# Patient Record
Sex: MALE | Race: WHITE | NOT HISPANIC OR LATINO | Employment: FULL TIME | URBAN - METROPOLITAN AREA
[De-identification: names, ages, dates, MRNs, and addresses within clinical notes are randomized per-mention and may not be internally consistent; named-entity substitution may affect disease eponyms.]

---

## 2023-12-01 ENCOUNTER — OFFICE VISIT (OUTPATIENT)
Dept: NEUROLOGY | Facility: CLINIC | Age: 58
End: 2023-12-01
Payer: COMMERCIAL

## 2023-12-01 ENCOUNTER — LAB (OUTPATIENT)
Dept: LAB | Facility: LAB | Age: 58
End: 2023-12-01
Payer: COMMERCIAL

## 2023-12-01 VITALS
HEIGHT: 69 IN | BODY MASS INDEX: 27.85 KG/M2 | HEART RATE: 63 BPM | DIASTOLIC BLOOD PRESSURE: 80 MMHG | TEMPERATURE: 97 F | WEIGHT: 188 LBS | SYSTOLIC BLOOD PRESSURE: 153 MMHG | RESPIRATION RATE: 18 BRPM

## 2023-12-01 DIAGNOSIS — R41.3 MEMORY LOSS: Primary | ICD-10-CM

## 2023-12-01 DIAGNOSIS — R41.3 MEMORY LOSS: ICD-10-CM

## 2023-12-01 LAB
TSH SERPL-ACNC: 2.54 MIU/L (ref 0.44–3.98)
VIT B12 SERPL-MCNC: 461 PG/ML (ref 211–911)

## 2023-12-01 PROCEDURE — 36415 COLL VENOUS BLD VENIPUNCTURE: CPT

## 2023-12-01 PROCEDURE — 84443 ASSAY THYROID STIM HORMONE: CPT

## 2023-12-01 PROCEDURE — 99205 OFFICE O/P NEW HI 60 MIN: CPT | Performed by: PSYCHIATRY & NEUROLOGY

## 2023-12-01 PROCEDURE — 1036F TOBACCO NON-USER: CPT | Performed by: PSYCHIATRY & NEUROLOGY

## 2023-12-01 PROCEDURE — 82607 VITAMIN B-12: CPT

## 2023-12-01 RX ORDER — FLUTICASONE PROPIONATE 50 MCG
1 SPRAY, SUSPENSION (ML) NASAL DAILY
COMMUNITY

## 2023-12-01 RX ORDER — CELECOXIB 200 MG/1
200 CAPSULE ORAL
COMMUNITY
Start: 2022-12-05

## 2023-12-01 RX ORDER — BUPROPION HYDROCHLORIDE 75 MG/1
150 TABLET ORAL 3 TIMES DAILY
COMMUNITY
Start: 2022-11-07

## 2023-12-01 RX ORDER — OMEPRAZOLE 20 MG/1
20 CAPSULE, DELAYED RELEASE ORAL
COMMUNITY
Start: 2023-01-19

## 2023-12-01 RX ORDER — TAMSULOSIN HYDROCHLORIDE 0.4 MG/1
0.4 CAPSULE ORAL DAILY
COMMUNITY

## 2023-12-01 RX ORDER — CLONAZEPAM 1 MG/1
1 TABLET ORAL EVERY 8 HOURS PRN
COMMUNITY
Start: 2023-01-31

## 2023-12-01 RX ORDER — LORATADINE 10 MG/1
10 TABLET ORAL DAILY
COMMUNITY

## 2023-12-01 RX ORDER — FLUOXETINE HYDROCHLORIDE 20 MG/1
80 CAPSULE ORAL DAILY
COMMUNITY
Start: 2023-01-19

## 2023-12-01 NOTE — PATIENT INSTRUCTIONS
It was a pleasure seeing you today.     I want you to start Thiamine 100mg daily.    I do recommend following either with a memory expert - Some of the  memory experts are Dr. Eugene Hope, Dr. Fernandez and Dr. Nathan Cerna. There are some research options as well at the [facility] or with general neurology.    Structural and organizational reinforcements are often helpful for individuals with memory complaints and executive difficulties.  from the following strategies:   a. Following a routine and consistent daily schedule.    b. Continuing to use a single planner, calendar, or electronic organizer to plan and manage appointment dates, activities, and tasks.   c. Working on one task at a time until it is completed.   d. Keeping a single list of tasks, listed by priority, and marking them when complete.   e. Placing reminders in places where they are easily noticed (e.g., a note on door).   f. Using a mobile phone or electronic calendar to set timers for tasks and events (e.g., medications, appointments).   g. Writing down important information (e.g., tasks, conversational details, list items) immediately, to strengthen encoding and for later reference. This will also help to free up cognitive resources to focus on the task at hand.   h. Associating new information with older, previously stored information (e.g., familiar categories, anecdotes, references, reminders).   i. Using recognition cue cards in everyday memory situations that come up with frequency, such as lists of routine household tasks, errands, or grocery items to prompt recall.   j. Having specific locations for frequently used items (e.g., keys, wallet, phone), especially for items most often needed when exiting home.       stress may intermittently interfere with cognitive effectiveness,      encouraged to maintain a socially active lifestyle, as social activities may offer cognitive stimulation, help to prevent feelings of  "depression, and provide emotional benefits that promote quality of life. You are likely to benefit from maximizing engagement in activities that you enjoy, as well as pursuing new meaningful hobbies.      Leading a physically, socially, and cognitively active lifestyle is important for healthy brain aging. Within the bounds of safety, good judgment, and medical advice, this includes engaging in regular physical activity (e.g., walking, swimming, yoga).   In general, the following activities and interventions are recommended for optimizing brain health and preserving cognitive function:   a. \"heart-healthy\"/cardiovascular diet;   b. good stress management (e.g., relaxation techniques);   c. management of any vascular risks (e.g., hypertension, cholesterol);   d. 30-60 minutes of daily aerobic exercise (e.g., walking, swimming);   e. social engagement (e.g., getting together with other people);    f. adequate sleep; and    g. cognitively stimulating activities (e.g., classes to learn new things, challenging puzzles).          For any urgent issues or needing to speak to a medical assistant please call [phone], option 6 during our office hours Monday-Friday 8am-4pm, and leave a voicemail with your concern.  My office will try to reach back you as soon as possible within 24 (business) hours.  If you have an emergency please call 911 or visit a local urgent care or nearest emergency room.      Please understand that Secure-24 is a useful communication tool for simple \"normal\" results or a refill request but I would not recommend using this tool for emergent or urgent issues or for conversations with me.  I am happy to ask my staff to rearrange a follow up visit or a virtual visit sooner than requested if appropriate for your care.     "

## 2023-12-01 NOTE — PROGRESS NOTES
Consultation:  Link     Mihai Ruelas is a 58 y.o. year old male here for consult for memory issues.   Referred by Dr. Gutierrez.   Accompanied by wife today as well who also provides history.   HPI  Memory issues began 4 years ago.  He has mood / behavioral issues.  Hx of drug and alcohol abuse.  Hx of use of various drugs in his life ie Marijuana, cocaine crystal meth ( last use - 18 months ago)-   Now does therapy .  He confabulates a lot.   He has trouble getting a word out.   He sees a psychiatrist now, Dr. Lloyd.  Mother and sister and brother and son have alcoholism. Mother had Alzheimer's disease.  Denies SI/HI.  Depression and anxiety are not controlled.   He is easily frustrated. Forgets things people tell him. Cannot complete a task.  Fatigue a lot.  He feels stubborn.  He feels the need for instant gratification.   He sleeps a lot, more than usual. Takes naps.  He wakes up to use the bathroom a lot at night.  Nov 8th- he stopped working.  He was having trouble staying at work.   He was sober for 14 years on / off.    Review of Systems    There is no problem list on file for this patient.    History reviewed. No pertinent past medical history.  Past Surgical History:   Procedure Laterality Date    CT ANGIO CORONARY ART WITH HEARTFLOW IF SCORE >30%  1/28/2023    CT ANGIO CORONARY ART WITH HEARTFLOW IF SCORE >30% 1/28/2023     Social History     Tobacco Use    Smoking status: Not on file    Smokeless tobacco: Not on file   Substance Use Topics    Alcohol use: Not on file     family history is not on file.  No current outpatient medications on file.  Not on File  There were no vitals taken for this visit.  Neurological Exam/Physical Exam:    Constitutional: General appearance: no acute distress. Pleasant.   Auscultation of Heart: Regular rate and rhythm, no murmurs, normal S1 and S2.   Carotid Arteries: no bruits  Peripheral Vascular Exam: No swelling, edema or tenderness to palpation in  extremities  Poor attention and concentration.  Language normal.  Recall poor.   Fund of knowledge: Patient displays adequate knowledge of current events.  Eyes: The ophthalmoscopic examination was normal.   Cranial nerve II: Visual fields full to confrontation.   Cranial nerves III, IV, and VI: Pupils round, equally reactive to light; EOMs intact. No nystagmus.   Cranial Nerve V: Facial sensation intact to LT bilaterally.   Cranial nerve VII: no facial droop  Cranial nerve VIII: Hearing is intact  Cranial nerves IX and X: Palate elevates symmetrically.   Cranial nerve XI: Shoulder shrug intact.   Cranial nerve XII: Tongue is midline.  Motor:  Muscle bulk was normal in both upper and lower extremities.    No atrophy.   Strength is normal.   Deep Tendon Reflexes: left biceps 2+ , right biceps 2+, left triceps 2+, right triceps 2+, left brachioradialis 2+, right brachioradialis 2+, left patella 2+, right patella 2+, left ankle jerk 2+, right ankle jerk 2+   Plantar Reflex: Toes downgoing to plantar stimulation on the left. Toes downgoing to plantar stimulation on the right.   Sensory Exam: Normal to vibratory sensation  Coordination:  no limb dystaxia and rapid alternating movements are intact.   Gait:  cautious.         Labs:  CBC:   Lab Results   Component Value Date    WBC 6.1 11/18/2018    HGB 16.8 11/18/2018    HCT 48.6 11/18/2018     11/18/2018     BMP:   Lab Results   Component Value Date     11/18/2018    K 4.3 11/18/2018     11/18/2018    CO2 26 11/18/2018    BUN 15 11/18/2018    CREATININE 1.60 (H) 11/18/2018    CALCIUM 9.5 11/18/2018     LFT:   Lab Results   Component Value Date    ALKPHOS 57 11/18/2018    BILITOT 0.5 11/18/2018    PROT 6.8 11/18/2018    ALBUMIN 4.4 11/18/2018    ALT 18 11/18/2018    AST 23 11/18/2018       Assessment/Plan   Problem List Items Addressed This Visit    None  Visit Diagnoses         Codes    Memory loss    -  Primary R41.3         Obtain b12/ tsh/ MRI  brain  Follow with general neurology / memory experts.   Follow up with psychiatry. Sleep and mood and drug use are affecting his memory.   Unlikely dementia.  May have ADHD - follow up with psychiatry , obtain neuropsychology.  Start thiamine.

## 2023-12-12 ENCOUNTER — APPOINTMENT (OUTPATIENT)
Dept: RADIOLOGY | Facility: CLINIC | Age: 58
End: 2023-12-12
Payer: COMMERCIAL

## 2023-12-27 DIAGNOSIS — G47.30 HYPERSOMNIA WITH SLEEP APNEA: Primary | ICD-10-CM

## 2023-12-27 DIAGNOSIS — G47.10 HYPERSOMNIA WITH SLEEP APNEA: Primary | ICD-10-CM

## 2024-01-02 ENCOUNTER — TELEPHONE (OUTPATIENT)
Dept: NEUROLOGY | Facility: CLINIC | Age: 59
End: 2024-01-02
Payer: COMMERCIAL

## 2024-01-16 ENCOUNTER — TELEPHONE (OUTPATIENT)
Dept: NEUROLOGY | Facility: CLINIC | Age: 59
End: 2024-01-16

## 2024-01-17 DIAGNOSIS — R06.83 SNORING: Primary | ICD-10-CM

## 2024-01-18 ENCOUNTER — APPOINTMENT (OUTPATIENT)
Dept: SLEEP MEDICINE | Facility: CLINIC | Age: 59
End: 2024-01-18
Payer: COMMERCIAL

## 2024-03-18 ENCOUNTER — OFFICE VISIT (OUTPATIENT)
Dept: SLEEP MEDICINE | Facility: CLINIC | Age: 59
End: 2024-03-18
Payer: COMMERCIAL

## 2024-03-18 VITALS
HEIGHT: 69 IN | SYSTOLIC BLOOD PRESSURE: 167 MMHG | RESPIRATION RATE: 18 BRPM | BODY MASS INDEX: 27.74 KG/M2 | DIASTOLIC BLOOD PRESSURE: 81 MMHG | OXYGEN SATURATION: 95 % | WEIGHT: 187.3 LBS | HEART RATE: 65 BPM

## 2024-03-18 DIAGNOSIS — G47.30 SLEEP DISORDER BREATHING: Primary | ICD-10-CM

## 2024-03-18 DIAGNOSIS — G47.10 HYPERSOMNIA WITH SLEEP APNEA: ICD-10-CM

## 2024-03-18 DIAGNOSIS — G47.50 PARASOMNIA, UNSPECIFIED TYPE: ICD-10-CM

## 2024-03-18 DIAGNOSIS — G47.30 HYPERSOMNIA WITH SLEEP APNEA: ICD-10-CM

## 2024-03-18 DIAGNOSIS — R06.83 SNORING: ICD-10-CM

## 2024-03-18 PROCEDURE — 99204 OFFICE O/P NEW MOD 45 MIN: CPT | Performed by: GENERAL PRACTICE

## 2024-03-18 PROCEDURE — 1036F TOBACCO NON-USER: CPT | Performed by: GENERAL PRACTICE

## 2024-03-18 ASSESSMENT — COLUMBIA-SUICIDE SEVERITY RATING SCALE - C-SSRS
1. IN THE PAST MONTH, HAVE YOU WISHED YOU WERE DEAD OR WISHED YOU COULD GO TO SLEEP AND NOT WAKE UP?: NO
2. HAVE YOU ACTUALLY HAD ANY THOUGHTS OF KILLING YOURSELF?: NO
6. HAVE YOU EVER DONE ANYTHING, STARTED TO DO ANYTHING, OR PREPARED TO DO ANYTHING TO END YOUR LIFE?: NO

## 2024-03-18 ASSESSMENT — PATIENT HEALTH QUESTIONNAIRE - PHQ9
10. IF YOU CHECKED OFF ANY PROBLEMS, HOW DIFFICULT HAVE THESE PROBLEMS MADE IT FOR YOU TO DO YOUR WORK, TAKE CARE OF THINGS AT HOME, OR GET ALONG WITH OTHER PEOPLE: EXTREMELY DIFFICULT
1. LITTLE INTEREST OR PLEASURE IN DOING THINGS: MORE THAN HALF THE DAYS
3. TROUBLE FALLING OR STAYING ASLEEP OR SLEEPING TOO MUCH: MORE THAN HALF THE DAYS
9. THOUGHTS THAT YOU WOULD BE BETTER OFF DEAD, OR OF HURTING YOURSELF: NOT AT ALL
7. TROUBLE CONCENTRATING ON THINGS, SUCH AS READING THE NEWSPAPER OR WATCHING TELEVISION: NEARLY EVERY DAY
8. MOVING OR SPEAKING SO SLOWLY THAT OTHER PEOPLE COULD HAVE NOTICED. OR THE OPPOSITE, BEING SO FIGETY OR RESTLESS THAT YOU HAVE BEEN MOVING AROUND A LOT MORE THAN USUAL: SEVERAL DAYS
4. FEELING TIRED OR HAVING LITTLE ENERGY: NEARLY EVERY DAY
5. POOR APPETITE OR OVEREATING: MORE THAN HALF THE DAYS
6. FEELING BAD ABOUT YOURSELF - OR THAT YOU ARE A FAILURE OR HAVE LET YOURSELF OR YOUR FAMILY DOWN: MORE THAN HALF THE DAYS
SUM OF ALL RESPONSES TO PHQ QUESTIONS 1-9: 16
2. FEELING DOWN, DEPRESSED OR HOPELESS: SEVERAL DAYS
SUM OF ALL RESPONSES TO PHQ9 QUESTIONS 1 AND 2: 3

## 2024-03-18 ASSESSMENT — ENCOUNTER SYMPTOMS
LOSS OF SENSATION IN FEET: 0
OCCASIONAL FEELINGS OF UNSTEADINESS: 0
DEPRESSION: 1

## 2024-03-18 NOTE — PROGRESS NOTES
I reviewed the resident/fellow's documentation and discussed the patient with the resident/fellow. I agree with the resident/fellow's medical decision making as documented in the note.    Louis Chew, DO

## 2024-03-18 NOTE — PROGRESS NOTES
"     Patient: Mihai Ruelas    [id number]  : 1965 -- AGE 58 y.o.    Provider: Louis Chew DO     Location [facility]   Service Date: 3/18/2024              [facility]  New Visit Note    HISTORY OF PRESENT ILLNESS     The patient's referring provider is: Delmi Shepherd MD    HISTORY OF PRESENT ILLNESS   Mihai Ruelas \"Maninder\" is a 58 y.o. male who presents to a [facility] for a sleep medicine evaluation with concerns of Referral (45 mins to fall sleep wakes up 3/4 times to use the bathroom./ Wakes up Nose is clogged feels like he can't breath. /Sleeps like 8/9 hours feels tired still when wakes up and takes hour naps during the day./No sleep study. ).     The patient  has no past medical history on file..    PAST SLEEP HISTORY    Patient has the following sleep-related diagnoses: Prior sleep study results:     CURRENT HISTORY    On today's visit, 3/18/2024, the patient reports fatigue, shallow sleep, frequent awakenings. He has not tried any intervention for these symptoms.     Sleep schedule  on weekdays / work days:  Usual Bedtime: 9-9:30pm in bed; falls asleep 45 minutes later  Sleep latency: wakes up 11pm to go to the bathroom (falls asleep right away), wakes up every 3 hours after that to urinate 2/2 prostate CA  Wake time : 6:30am  Total sleep time average/day: 9 hours/day  Awakenings: 4 times per night, nocturia, short.   Naps: 1-2 per day, around 11am and sometimes 3pm, about 1 hr each, refreshing.     Sleep Meds: klonopin for anxiety, does not help him sleep  Caffeine: 1cup of coffee in morning and 1 cold brew in afternoon around 2-3pm      Sleep schedule  on weekends/non work days :  Usual Bedtime:  10pm and falls asleep 45mins later   Wake time : 7-7:30am    Sleep meds: klonopin for anxiety, does not help him sleep    Occupation: not currently working but he is a , not working currently but " worked 7am-4:30pm    Preferred sleeping position: SLEEP POSITION: sidelying    Sleep-related ROS:    Snoring:  y  Witnessed apneas: while awake but not sleeping  Gasping/choking: n       Breathing problems: n               Mouth breathing: y       Am Dry mouth:  y           Nasal congestion:  y       am headaches: y    Sleep is described as unrefreshing.     Daytime sleepiness: y  Fatigue or decreased energy: y  Difficulty remembering things in daytime: y  Difficulty staying focused in daytime: : y  Irritable during the day: y  Drowsy driving: no but can't fully focus on road  Hx of car accident: n      RLS screen:  denies    Sleep-related behaviors:   Kicking 1-2x per month, caressing her face 3x per week. Unsure if associated with a dream. Denies any major injuries. Father hx of parkinson's disease.     Sleep environment:  Bed partner :  yes  Pets in bed :   no  Bedroom temperature: BEDROOM TEMP: cool  Noise :   waterfall white noise machine  Issues with bed comfort : n    ESS: 9        REVIEW OF SYSTEMS     REVIEW OF SYSTEMS  Review of Systems   All other systems reviewed and are negative.        ALLERGIES AND MEDICATIONS     ALLERGIES  Allergies   Allergen Reactions    Lisinopril Swelling    Penicillins Rash and Unknown       MEDICATIONS  Current Outpatient Medications   Medication Sig Dispense Refill    buPROPion (Wellbutrin) 75 mg tablet Take 2 tablets (150 mg) by mouth 3 times a day.      celecoxib (CeleBREX) 200 mg capsule Take 1 capsule (200 mg) by mouth once daily.      clonazePAM (KlonoPIN) 1 mg tablet Take 1 tablet (1 mg) by mouth every 8 hours if needed.      ergocalciferol, vitamin D2, (VITAMIN D2 ORAL) Take by mouth.      FLUoxetine (PROzac) 20 mg capsule Take 4 capsules (80 mg) by mouth once daily.      fluticasone (Flonase) 50 mcg/actuation nasal spray Administer 1 spray into each nostril once daily. Shake gently. Before first use, prime pump. After use, clean tip and replace cap.      loratadine  "(Claritin) 10 mg tablet Take 1 tablet (10 mg) by mouth once daily.      omeprazole (PriLOSEC) 20 mg DR capsule Take 1 capsule (20 mg) by mouth once daily.      tamsulosin (Flomax) 0.4 mg 24 hr capsule Take 1 capsule (0.4 mg) by mouth once daily.       No current facility-administered medications for this visit.         PAST HISTORY     PAST MEDICAL HISTORY    Prostate Ca, anxiety, depression, HTN    PAST SURGICAL HISTORY:  Past Surgical History:   Procedure Laterality Date    CT ANGIO CORONARY ART WITH HEARTFLOW IF SCORE >30%  1/28/2023    CT ANGIO CORONARY ART WITH HEARTFLOW IF SCORE >30% 1/28/2023   -R shoulder replacement    FAMILY HISTORY  Family History   Problem Relation Name Age of Onset    Parkinsonism Father       DOES/DOES NOT EC: does not have a family history of sleep disorder.      SOCIAL HISTORY  He  reports that he has never smoked. He has never used smokeless tobacco. He reports that he does not currently use alcohol. He reports that he does not currently use drugs. He currently lives with a partner.     Caffeine consumption: 2 cups of coffee, in am.   Alcohol consumption: n  (quit 6 mos ago)  Smoking: n  Marijuana: not currently, previous when was 33.  Other drugs: was taking pain medication for hip and went through addiction but is not currently      PHYSICAL EXAM     VITAL SIGNS: /81   Pulse 65   Resp 18   Ht 1.753 m (5' 9\") Comment: neck circumfrance 17  Wt 85 kg (187 lb 4.8 oz)   SpO2 95%   BMI 27.66 kg/m²      PREVIOUS WEIGHTS:  Wt Readings from Last 3 Encounters:   03/18/24 85 kg (187 lb 4.8 oz)   12/01/23 85.3 kg (188 lb)       Physical Exam    Constitutional: Alert and oriented, cooperative, no obvious distress.   HENT: normocephalic.   Eyes: PERRLA, nonicteric   Neck: Supple, trachea midline   respiratory: CTA bilaterally, no wheezing/ crackles/ cough  Cardiac: no rub/ gallops  GI:BS in all 4 quadrants, Soft, nontender, no masses  musculoskeletal/ Extremities: No " clubbing  integumentary: no significant rashes observed.   Neurologic: AOx3.   psychiatric: appropriate mood and affect.  Modified Mallampati: 4    RESULTS/DATA         ASSESSMENT/PLAN     Mr. Ruelas is a 58 y.o. male and  has no past medical history on file. He was referred to the [facility] for evaluation of sleep apnea.     Problem List Items Addressed This Visit    None  Visit Diagnoses       Hypersomnia with sleep apnea        Relevant Orders    In-Center Sleep Study (Sleep Provider Only)    Snoring        Relevant Orders    In-Center Sleep Study (Sleep Provider Only)            Problem List and Orders    Pt has pmhx including Prostate Ca, anxiety, depression, HTN.     1- Sleep disorder breathing.   Symptoms include snoring, fatigue, low energy, snoring, night time awakenings, nocturia.     -ordered sleep study    -do not drive or operate heavy machinery if drowsy.  -avoid sedating substances/ medication, alcohol, illicit drugs and tobacco.    2- parasomnia  Kicking, punching, caressing his bed partner. Unsure if these actions are related to dreams.   Unclear if RBD?,  Father with hx of parkinson's    -counseled in details about safety measures.     3- Overweight  counseled on eating a healthy diet and exercising as tolerated.    Follow up after sleep study or sooner as needed.

## 2024-03-18 NOTE — PATIENT INSTRUCTIONS
[facility] Sleep Medicine   [facility]  [address] [place] [facility] [address]  [phone]       NAME: Mihai Ruelas   DATE: 3/18/2024     Your Sleep Provider Today: Louis Chew DO  Your Primary Care Physician: Jules Gutierrez MD   Your Referring Provider: Delmi Shepherd MD    DIAGNOSIS:   1. Sleep disorder breathing  In-Center Sleep Study (Sleep Provider Only)      2. Hypersomnia with sleep apnea  Referral to Adult Sleep Medicine    CANCELED: In-Center Sleep Study (Sleep Provider Only)      3. Snoring  Referral to Adult Sleep Medicine    CANCELED: In-Center Sleep Study (Sleep Provider Only)      4. Parasomnia, unspecified type  In-Center Sleep Study (Sleep Provider Only)          Thank you for coming to the Sleep Medicine Clinic today! Your sleep medicine provider today was: Louis Chew DO Below is a summary of your treatment plan, other important information, and our contact numbers:      TREATMENT PLAN   Follow up after sleep study or sooner as needed.       IMPORTANT INFORMATION     Call 911 for medical emergencies.  Our offices are generally open from Monday-Friday, 9 am - 5 pm.  If you need to get in touch with me, you may either call me and my team(number is below) or you can use [employer].  If a referral for a test, for CPAP, or for another specialist was made, and you have not heard about scheduling this within a week, please call scheduling at [phone] ([phone]).  If you are unable to make your appointment for clinic or an overnight study, kindly call the office at least 48 hours in advance to cancel and reschedule.  If you are on CPAP, please bring your device's card or the device to each clinic appointment.   There are no supporting services by either the sleep doctors or their staff on weekends and Holidays, or after 5 PM on weekdays.   If you have been asked to come to a sleep study, make sure you bring toiletries, a comfy pillow, and any  nighttime medications that you may regularly take. Also be sure to eat dinner before you arrive. We generally do not provide meals.      PRESCRIPTIONS     We require 7 days advanced notice for prescription refills. If we do not receive the request in this time, we cannot guarantee that your medication will be refilled in time.      IMPORTANT PHONE NUMBERS     Sleep Medicine Clinic Fax: [phone]  Appointments (for Pediatric Sleep Clinic): [phone] - option 1  Appointments (for Adult Sleep Clinic): [phone] - option 2  Appointments (For Sleep Studies): [phone] - option 3  Behavioral Sleep Medicine: [phone]  Sleep Surgery: [phone]  ENT (Otolaryngology): [phone]  Headache Clinic (Neurology): [phone]  Neurology: [phone]  Psychiatry: [phone]  Pulmonary Function Testing (PFT) Center: [phone]  Pulmonary Medicine: [phone]  [employer] (DME): [phone]  [employer] (DME): [phone]  [facility] (DME): [phone]      OUR ADULT SLEEP MEDICINE TEAM   Please do not hesitate to call the office or sleep nurse with any questions between appointments:    Adult Sleep Nurses (Quita Dinero, RN and Paulina Reagan RN):  For clinical questions and refilling prescriptions: [phone]  Email sleep diaries and other documents at: [email or web address]    Adult Sleep Medicine Secretaries:  Brittany Russ (For [place]ise[place]/Pagan/Krise/[place]ro[place]/Yeh/Ohara):   P: [phone]  F: [phone]  Marianela Silva (For [place]genbiller): P: [phone]  Fax: [phone]  Lilliam Longo (For Jurcevic/Blank): P: [phone]  F: [phone]  Justine Plascencia (For Julien): P: [phone]  F: [phone]  Rosa Jacobson (For Evelyn/Leon/Naina): P: [phone]  F: [phone]  Jael Davison (For [place]/Zeus): P: [phone]  F: [phone]     Adult Sleep Medicine Advanced Practice Providers:  Joao Corrales  "([place], [place])  Lilia Quintero ([place], [facility])  Zayra Rojas CNP ([place], [place], [place])  Jocelyn Aguilar CNP ([place], [place], [place])  Estelle Victoria ([place], [place], [place])  Araceli Schulz CNP ([place], [place])        OUR SLEEP TESTING LOCATIONS     Our team will contact you to schedule your sleep study, however, you can contact us as follow:  Main Phone Line (scheduling only): [phone]), option 3  Adult and Pediatric Locations   [place] (6 years and older): [facility] - 4th floor ([address]) After hours line: [phone]  [facility] (Main campus: All ages): [facility], 6th floor. After hours line: [phone]   [place] (5 years and older; younger considered on case-by-case basis): [address], [address]. Scheduling  After hours line: [phone]   [place] (6 years and older): [address]; [address]; [address]   [place] (6 years and older): [address]  After hours line: [phone]   [facility] (13 years and older) in [place]: [address], 2nd floor  After hours line: [phone]  [facility] (13 year and older): [address]  After hours line: [phone]     Adult Only Locations:   [place] (18 years and older): [address] [facility], 2nd floor   [place] (18 years and older): [address] [facility] [facility]; 4th floor  After hours line: [phone]  [facility] (18 years and older) at [place]: [address] [facility]  After hours line: [phone]          CONTACTING YOUR SLEEP MEDICINE PROVIDER     Send a message directly to your provider through \"My Chart\", which is the email service through your  Records Account: [email or web address]   Call [phone] and leave a message. One of the administrative assistants will forward the message to your sleep medicine provider " "through \"My Chart\" and/or email.     Your sleep medicine provider for this visit was: Louis Chew DO        "

## 2024-04-04 ENCOUNTER — OFFICE VISIT (OUTPATIENT)
Dept: PSYCHOLOGY | Facility: HOSPITAL | Age: 59
End: 2024-04-04
Payer: COMMERCIAL

## 2024-04-04 DIAGNOSIS — F41.9 ANXIETY: ICD-10-CM

## 2024-04-04 DIAGNOSIS — G47.30 HYPERSOMNIA WITH SLEEP APNEA: ICD-10-CM

## 2024-04-04 DIAGNOSIS — F19.21: ICD-10-CM

## 2024-04-04 DIAGNOSIS — F32.A DEPRESSION, UNSPECIFIED DEPRESSION TYPE: ICD-10-CM

## 2024-04-04 DIAGNOSIS — G47.10 HYPERSOMNIA WITH SLEEP APNEA: ICD-10-CM

## 2024-04-04 DIAGNOSIS — R41.3 MEMORY LOSS: Primary | ICD-10-CM

## 2024-04-04 DIAGNOSIS — R41.9 COGNITIVE COMPLAINTS: ICD-10-CM

## 2024-04-04 PROCEDURE — 96133 NRPSYC TST EVAL PHYS/QHP EA: CPT | Mod: AH | Performed by: CLINICAL NEUROPSYCHOLOGIST

## 2024-04-04 PROCEDURE — 96116 NUBHVL XM PHYS/QHP 1ST HR: CPT | Mod: AH | Performed by: CLINICAL NEUROPSYCHOLOGIST

## 2024-04-04 PROCEDURE — 96116 NUBHVL XM PHYS/QHP 1ST HR: CPT | Performed by: CLINICAL NEUROPSYCHOLOGIST

## 2024-04-04 PROCEDURE — 96138 PSYCL/NRPSYC TECH 1ST: CPT | Performed by: CLINICAL NEUROPSYCHOLOGIST

## 2024-04-04 PROCEDURE — 96139 PSYCL/NRPSYC TST TECH EA: CPT | Mod: AH | Performed by: CLINICAL NEUROPSYCHOLOGIST

## 2024-04-04 PROCEDURE — 96138 PSYCL/NRPSYC TECH 1ST: CPT | Mod: AH | Performed by: CLINICAL NEUROPSYCHOLOGIST

## 2024-04-04 PROCEDURE — 96132 NRPSYC TST EVAL PHYS/QHP 1ST: CPT | Mod: AH | Performed by: CLINICAL NEUROPSYCHOLOGIST

## 2024-04-04 PROCEDURE — 96139 PSYCL/NRPSYC TST TECH EA: CPT | Performed by: CLINICAL NEUROPSYCHOLOGIST

## 2024-04-04 PROCEDURE — 96132 NRPSYC TST EVAL PHYS/QHP 1ST: CPT | Performed by: CLINICAL NEUROPSYCHOLOGIST

## 2024-04-04 PROCEDURE — 96133 NRPSYC TST EVAL PHYS/QHP EA: CPT | Performed by: CLINICAL NEUROPSYCHOLOGIST

## 2024-04-04 NOTE — PROGRESS NOTES
Neuropsychology Evaluation    Name: Mihai Ruelas  : 1965  MRN: [id number]  Referring Provider: Delmi Shepherd MD  Date of Service: 24     Reason for Referral: Mihai Ruelas was referred for neuropsychological evaluation given a history of memory loss and cognitive complaints in the context of substance dependence (in remission) and significant psychiatric distress.  The examiner explained the rationale for the evaluation along with the limits of confidentiality and written informed consent was obtained.     Final Diagnosis:   Cognitive Complaints (ICD-10 #R41.9)  Depression (ICD-10 #F32.A)  Anxiety (ICD-10 #F41.9)  Polysubstance Dependence, in remission (ICD-10 #F19.21)  Hypersomnia with Sleep Apnea (ICD-10 #G47.10 and #G47.30)    Summary/Impressions: Mr. Ruelas was referred for evaluation given a history of memory and cognitive complaints in the context of significant psychiatric distress, polysubstance dependence (in remission), and reported sleep disturbance.  The patient noted that he does have a history of significant trauma and polysubstance dependence.  Approximately 1 year ago, he discontinued substance use which resulted in a withdrawal period as well as substantial increases in psychiatric distress and worsening of neurocognitive status.  Anxiety symptoms were noted to be substantial enough that it resulted in his having difficulty controlling psychomotor agitation and performing at work.  Though the patient describes significant psychiatric symptoms and substance dependence, he does have a history of Alzheimer's disease in a first-degree relative that had onset in mid 60s.  On testing, neurocognitive performance was generally intact across the vast majority of measures, though some inefficiency was observed on memory measures.  Specifically, he had intact neurocognitive performance on 2 of 4 memory measures (3 of 4 if utilizing recognition cues).  The patient was observed to being anxious  during the memory testing where he struggled and he reported that his mind was wandering during that time as well.  No clear pattern of temporal inefficiency was observed otherwise in his neurocognitive profile (e.g. naming and semantic verbal fluency were both quite consistent with premorbid estimates).  He endorsed mild to moderate symptoms of depression and anxiety (somewhat more depression than anxiety) which was not entirely consistent with his self-report of more substantial degrees of distress (which aligned with the observed anxiety during testing).  Overall, the patient's neurocognitive performance is most concerning for cognitive symptoms that are likely secondary to his history of depression and anxiety which have worsened following his abstaining from substance use.  Some contribution from sleep disturbance/sleep apnea is also possible (possible symptoms of REM behavioral disorder were noted in his recent sleep evaluation).  The early symptoms of a separate neurocognitive disorder cannot be entirely ruled out and additional follow-up is recommended.    Recommendations:  1. The patient should be reassured that his neurocognitive profile was generally intact on the vast majority of administered measures and the variability on memory testing was possibly related to anxiety symptoms and related inattention.  2. The patient should continue to aggressively target symptoms of depression and anxiety through his mental health management team.  With improved psychiatric symptoms, improved neurocognitive performance is quite likely.  3. The patient is also scheduled for sleep study and was encouraged to complete that evaluation and follow-up with any indicated treatment given that sleep disturbance may also be impacting his neurocognitive function.  4. The patient was educated regarding brain healthy activities, including high degrees of activity (physical, cognitive, and social) as well as a brain healthy diet  (e.g. the [facility]'s MIND diet).   5. Repeated evaluation in 12 to 18 months in order to monitor neurocognitive status over time and to assist with treatment planning is recommended.  This should allow time for noted psychiatric and sleep treatments to take effect.  Based on neurocognitive findings at that time, follow-up with behavioral neurology in [facility] can be considered.    Results of the assessment were conveyed to the patient, caregiver, and/or provider within 14 days of completion.   The patient/caregiver acknowledged understanding of test results, associated recommendations, and healthcare plan.    History of Presenting Illness: Mr. Ruelas is a 58 y.o. right-handed male with a history of memory loss and cognitive complaints in the context of substance dependence (in remission) and significant psychiatric distress.  The patient is followed by Dr. Shepherd in the Department of Neurology.  The patient met with Dr. Shepherd on 12/1/2023 where was noted that the patient reported memory difficulties in recent years but also describes significant mood and behavioral issues as well as drug and alcohol abuse (followed by psychiatry).  History of Alzheimer's disease was noted (mother).  Overall impressions were that sleep and mood as well as a history of drug use was likely affecting the patient's memory, with dementia noted to be of low likelihood.  ADHD was also identified among the differentials.  Following up with psychiatry, neuropsychological evaluation, blood work (B12, TSH), and MRI of the brain were ordered.  The patient was encouraged to follow-up with a brain health and memory neurologist and starting thiamine (100 mg) was also recommended.  Telephone notes on 1/3/2024 and 1/17/2024 indicate that the patient completed MRI of the brain and it was read as normal by Dr. Shepherd.  The patient also voiced concern in a patient message regarding sleep and the referral for sleep  medicine was placed by Dr. Shepherd as well.  That appointment was completed on 3/18/2024 where was noted that the patient or diagnoses included hypersomnia with sleep apnea and snoring, with some parasomnia behaviors noted (unclear if REM behavior disorder).  The patient was referred for sleep study.    At the time of the current evaluation, the patient described difficulties with memory, attention/concentration, and language disturbance.  He noted having difficulty with his mind wandering during conversations which often impacts his ability to recall that information.  He noted that because of this, cues and reminders do not always provide benefit for conversations; however, they do assist with appointments and other information.  He also describes some remote autobiographical memory gaps (e.g. during childhood, being the best man in a friend's wedding, etc.).  The patient does have a significant substance use history but reported that these events did not take place during blackouts or other high substance use periods.  Though he identified difficulties remembering some autobiographical information, he did indicate that he is more likely to remember traumatic events (both remote and recent).  In addition to his mind wandering during conversation, he also described difficulties with rereading and multitasking.  Some circumlocution and word finding difficulties were also reported, though other symptoms of paraphasia were denied.  Reasoning/executive function and nonverbal/visuospatial ability were described as within normal limits.  The patient did indicate that he can become distracted and affect some instrumental activities of daily living (e.g. forgetting that he left the dog out, forgetting a bill, leaving the faucet running, etc.); however, he denied that these events were severe.  He also noted that somnolence and low interest/initiative can influence his daily function.  Cognitive symptoms were noted to be  most notable within the last year and generally occurred following his abstaining from substance use (cocaine and methamphetamine).  Specifically, he reported that he felt that his cognition was generally strong when using substances but worsened during his withdrawal period has not improved.  He also reported that substantial increases in depression and anxiety were noted after having discontinued substance use.    Relevant Medical Status & History: The patient denied any known neurological injury or illness.  As noted above, he does have significant sleep disturbance which he acknowledged and reported that he is scheduled for a sleep study.  A family history of alcoholism (brother, mother, sister) and Alzheimer's (mother with onset in mid 60s) was endorsed.  Additional diagnoses include prostate cancer (diagnosed 1 year ago, followed by urology, and described the patient as being not an aggressive form) and psoriatic osteoarthritis (general pain level is rated at 5 out of 10, 10 being most severe).    Current Outpatient Medications:     buPROPion (Wellbutrin) 75 mg tablet, Take 2 tablets (150 mg) by mouth 3 times a day., Disp: , Rfl:     celecoxib (CeleBREX) 200 mg capsule, Take 1 capsule (200 mg) by mouth once daily., Disp: , Rfl:     clonazePAM (KlonoPIN) 1 mg tablet, Take 1 tablet (1 mg) by mouth every 8 hours if needed., Disp: , Rfl:     ergocalciferol, vitamin D2, (VITAMIN D2 ORAL), Take by mouth., Disp: , Rfl:     FLUoxetine (PROzac) 20 mg capsule, Take 4 capsules (80 mg) by mouth once daily., Disp: , Rfl:     fluticasone (Flonase) 50 mcg/actuation nasal spray, Administer 1 spray into each nostril once daily. Shake gently. Before first use, prime pump. After use, clean tip and replace cap., Disp: , Rfl:     loratadine (Claritin) 10 mg tablet, Take 1 tablet (10 mg) by mouth once daily., Disp: , Rfl:     omeprazole (PriLOSEC) 20 mg DR capsule, Take 1 capsule (20 mg) by mouth once daily., Disp: , Rfl:      "tamsulosin (Flomax) 0.4 mg 24 hr capsule, Take 1 capsule (0.4 mg) by mouth once daily., Disp: , Rfl:     Psychiatric Status & History: As noted above, the patient reported longstanding diagnoses of depression and anxiety, including a history of significant trauma during childhood and adulthood.  He is followed by mental health management team in [place] that includes psychiatry (Trenton Ledezma, CNP) and an individual counselor (Vinh Morrow [facility]).  Both depression and anxiety were rated at 7 out of 10 (10 being most severe) which was noted to be a substantial increase over baseline.  Depression was noted to generally be more significant than anxiety,, though a significant history of trauma was endorsed by the patient and the patient reported that substantial degrees of psychomotor agitation impacted his ability to work and resulted in his deciding to retire early (\"I couldn't sit still and focus\").  Some passive thoughts of death were acknowledged, but the patient denied active suicidal ideation or auditory hallucination.  He does meet weekly with his counselor and regularly with his psychiatric management team.    Substance Use History: As noted above, the patient has a history of polysubstance abuse, with a period of alcohol dependence that was followed by high degrees of cocaine and methamphetamine use ($400,000 worth in 2 years).  The patient reported that he is in recovery and has completely abstained from substances for approximately 1 year.  He noted that he did experience withdrawal symptoms at that time, primarily related to hypersomnolence and noted that he was unsure that those symptoms had improved.  He also described substantial worsening of psychiatric status after his abstaining from substance use.  He denied having any episodes of mental status change that required hospitalization (e.g. Wernicke's encephalopathy).    Developmental/Educational/Psychosocial History: The patient denied any " history of known developmental or academic diagnoses.  Indicated that he was a B-C student (2.5-2.8 GPA reported).  He denied ever being held back in school or diagnosed with a learning condition but acknowledged that some mild attention symptoms have been longstanding.  He did complete a technical degree in drafting but did not use this in his occupation.  He reported working as a  and in  for his full career but noted that he change position frequently within the last 6 years (10 positions reported in the last 6 years).  He described retiring in recent months due to significant psychomotor agitation and difficulties with anxiety.  He is  with 3 grown children but lives with his girlfriend and a 38-year-old son who is also struggling with alcoholism.  He noted that his son moved in approximately 6 months ago (the son was homeless prior to that time) and this was noted to be a stressful situation for the patient.    Procedures/Tests: In addition to the clinical interview, the following tests were administered: Test of Memory Malingering (TOMM); Dot Counting Test (DCT); Wechsler Test of Adult Reading (WTAR); Wechsler Adult Intelligence Scale, 4th Edition (WAIS-IV); Dementia Rating Scale, 2nd Edition (DRS-2); Trail Making Test; Stroop Color and Word Test (Stroop); Modified [place] Card Sorting Test (M-WCST); Neuropsychological Assessment Battery (NAB), Naming subtest; Verbal Fluency (FAS and Animal Naming); Finger Tapping; Extended Complex Figure Test (ECFT); Wechsler Memory Scale, 4th Edition (WMS-IV), Logical Memory subtest; Mariscal Verbal Learning Test, Revised (HVLT-R); Brief Visuospatial Memory Test, Revised (BVMT-R); Roland Depression Inventory, 2nd Edition (BDI-II); and State Trait Anxiety Inventory (STAI).    Cognitive testing was administered and results were used to inform counseling on safety and potential patient risks.  Cognitive testing was administered and  interpretation of results included consideration of appropriate and relevant cultural-linguistic and demographic factors.  Cognitive testing was administered and results of assessment informed the determination of diagnosis or further clarified etiological factors of cognitive impairment or complaints.    Behavioral Observations/Neurobehavioral Status Exam: The patient arrived on time, alone, and presented as an appropriately dressed and groomed, English-speaking, white male who appeared his stated age. He acknowledged understanding that in-person appointments carry an increased risk for COVID-19 and other public health concerns but wished to proceed.  All recommended infection control procedures were followed.    General Appearance: Well groomed, appropriate eye contact  Attitude/Behavior: Cooperative  Motor: Psychomotor agitation  Speech: Normal rate, volume, prosody  Gait/Station: WFL - Within functional limits  Affect: Anxious, Euthymic, full-range  Thought Process: Linear, goal directed  Thought Associations: No loosening of associations  Thought Content: Normal  Perception: No perceptual abnormalities noted  Sensorium: Alert and oriented to person, place, time and situation  Insight: Intact  Judgement: Intact  Engagement/Approach to Testing: Performance validity testing was within normal limits (TOMM, DCT, and RDS).    Throughout the interview, the patient regularly returned to a history of substance use, anxiety, and psychiatric symptoms.  He also reported concern about a family history of dementia and having watched his mother decline.  During testing, the patient was noted to demonstrate some symptoms of anxiety during memory testing and when asked about this afterwards, the patient reported that his mind was possibly wandering during those tasks and he had some difficulties paying attention (possibly related to anxiety).  Some caution is warranted in the interpretation of testing.    Results/Data:        Descriptors:    T-Score Standard Score Z-Score Scaled Score %ile Rank   Exceptionally High > 70 > 130 > 2.0  > 16 > 98   Above Average 64-69 120-129 1.4-1.9 15 91-97   High Average 57-63 110-119 0.7-1.3 12-14 75-90   Average 43-56  0.6 to -0.6 8-11 25-74   Low Average 37-42 80-89 -1.3 to -0.7 6-7 9-24   Below Average 30-36 70-79 -2.0 to -1.4 4-5 2-8   Exceptionally Low < 30 < 70  < -2.0 < 4 < 2     Mr. Ruelas's performance on premorbid measures fell into the average to low average range (WTAR, WAIS-IV Vocabulary and Matrix Reasoning). His global intellectual testing was average range overall (WAIS-IV GAI = 99, 47th percentile), with no meaningful difference in verbal and nonverbal ability.  On a measure of global neurocognitive status and dementia screening (DRS-2), performance was average range overall (DRS-2 Total, 41-59th percentile), with average to low average range performance noted across DRS-2 items.    Mr. Robertos performance on a measure of working memory from the WAIS-IV (Digit Span) fell into the high average range. His performance on a measure of attention and visuomotor processing speed (Trail Making Test) was high average range across trials, with a mild reduction to the average range from Part A to Part B with the addition of a cognitive flexibility task. On a measure of complex attention and his ability to inhibit himself (Stroop), the patient showed average range performance on the interference trial after taking into account slowed performance during initial trials (particularly for color naming). He showed average range overall performance on a measure of abstract reasoning and cognitive flexibility (M-WCST), completing all 6 categories and making no perseverative errors. His naming performance (NAB Naming) was average range. On measures of verbal fluency (FAS and Animal Naming), performances were average to low average range across trials with no discrepancy between performances.  The patient's finger tapping and motor speed (Finger Tapping) was average range bilaterally, with the expected pattern of dominant, right hand superiority. His ability to construct a complex figure (ECFT) was average range and maintained the general gestalt of the figure with only very minor distortion of design elements.     Mr. Robertos memory performance showed some variability that was possibly reflective of significant anxiety and inattention during some of the tasks; however, overall performance was intact on 2 out of 4 measures, with 3 out of 4 measures intact with the use of recognition cues. Specifically, he showed average to low average range learning and memory of stories (WMS-IV Logical Memory), with above average range retention of learned information.  The patient's learning and memory for a complex figure (ECFT) was also average range across trials, including average range immediate/delayed recall (maintaining the general gestalt of the figures and retaining most of the information learned) as well as average range recognition performance.  In contrast to these intact performances, Mr. Robertos learning and memory of a word list (HVLT-R) was generally weak, with exceptionally low range total recall across the learning trials, exceptionally low range delayed recall (42% retained), and exceptionally low range recognition performance on the HVLT-R.  His learning and memory of basic shapes and designs (BVMT-R) was also weaker than premorbid estimates, with below average range total recall across the learning trials, exceptionally low range delayed recall (66% retained), but low average range recognition performance on the BVMT-R.  As noted above, the patient was observed to being anxious and reported that his mind was wandering during the HVLT-R and BVMT-R measures.     The patient was administered screening measures for the identification of psychological distress (BDI-II and STAI).  Mild/moderate  degrees of depression and mild/minimal degrees of anxiety were endorsed.  This is somewhat inconsistent with his report of substantial (7 out of 10) degrees of depression and anxiety during the interview, including having left his job due to anxiety and recent months.      06804 = 1; 91599 = 1; 56276 = 2; 42896 = 1; 98371 = 7

## 2024-04-04 NOTE — PROGRESS NOTES
NEUROPSYCHOLOGICAL DATA SUMMARY    Name: Mihai Ruelas  : 1965  MRN: [id number]    Date of Service: 24    Age: 58 y.o.  Race:   Education: 14  Preferred Hand: RH  Examiner: Johnny Jc  Psychometrist: Laura Orozco    Descriptors:    T-Score Standard Score Z-Score Scaled Score %ile Rank   Exceptionally High > 70 > 130 > 2.0 > 16 > 98   Above Average 64-69 120-129 1.4-1.9 15 91-97   High Average 57-63 110-119 0.7-1.3 12-14 75-90   Average 43-56  0.6 to -0.6 8-11 25-74   Low Average 37-42 80-89 -1.3 to -0.7 6-7 9-24   Below Average 30-36 70-79 -2.0 to -1.4 4-5 2-8   Exceptionally Low < 30 < 70 < -2.0 < 4 < 2     Neuropsychological Data Summary  *Interpretation of scores should be made with caution and in consultation with appropriate professionals*    Premorbid Estimators Raw  Std Sc  %ile   WTAR Reading (# Correct) 1 37  107  68   WTAR Estimated FSIQ -  109  73     Wechsler Adult Intelligence Scale-IV 1   Verbal ScS  %ile Work Mem  ScS  %ile   Similar. 10  50 Dig Span 13  84   Vocab 9  37       Percep ScS  %ile       Blk Alex 13  84       Matrix 7  16       Factors Std Sc  %ile Quotient Std Sc  %ile   VCI 98  45 GAI 99  47   YOSVANY 100  50         DRS-2 1 Raw  ScS  %ile   Attention 36  11  60-71   Initiation/Persev. 37  11  60-71   Construction 6  10  41-59   Conceptualization 38  11  60-71   Memory  21  6  6-10   Total Score 138  10  41-59      Raw  T  %ile   Stroop 3 Word 86  40  16    Color 49  29  2    Color-Word 28  41  19    Interference -3  46  34   Trail Making 2 Part A 21 (0) 63  90                            Part B 69 (1) 52  58   Finger Tapping 2DH 55.4  54  66                 NDH 48.8  54  66   M-WCST 1         # Categories 6  55  69   # Perseverative Errors 0  63  90   # Total Errors 0  74  99   % Perseverative Errors 0%  63  90   NAB Naming Test 2 31  55  69   COWA 2  Phonemic   31    42    21   Semantic 17  46  34     Wechsler Memory Scale-IV1      Raw      ScS  %ile   Logical  Memory I 19  7  16   Logical Memory II 20  10  50   Logical Memory Contrast -  14  91   Logical Memory Recog A=1/15  B=15/15  >75     ROCFT / ECFT 1       Raw  ScS  %ile   Copy  32  10  50   Immediate Recall 18  11  63   Delayed Recall 15  10  50   Delayed Recognition (ECFT) 16  10  50   Matching (ECFT) 9    >15   HVLT-R 1  (Form _1       Raw  T  %ile   Trial 1 3  25  1   Trial 2 6  29  2   Trial 3 7  25  1   Total Recall  16  23  <1   Learning  3       Trial 4 (Delayed Recall) 3  <20  <1   % Retained  42%  <20  <1   T+  9  See Discrim. Ind.   F+ 1  See Discrim. Ind.   Discrimination Index  8  28  1     BVMT-R 1 (Form 1)       Raw  T  %ile   Trial 1 4  43  24   Trial 2 3  26  1   Trial 3 6  34  5   Total Recall  13  32  4   Learning  2  41  18   Delayed Recall  4  29  2   % Retained 66%    3-5   T+  (F+) 6 (1)  See Discrim. Ind.   Discrimination Index  5    11-16   Copy Score (Optional) 12            Raw  T  %ile/Severity   BDI-II 1  18       STAI 1 State 38  53  69    Trait 42  59  84      Raw       TOMM 1 44  50  50   Dots 1 8  5.6 v. 1.8  0e   RDS 1 14  RDS-R  18     Test Normative References:  Test Manual  ANGELINE Mcgrath., Ramila, RRamsey BRISCOE., & Psychological Assessment Resources, Inc[employer] (2004). Revised comprehensive norms for an expanded [place] battery: Demographically adjusted neuropsychological norms for  and  adults, professional manual. [facility]: Psychological Assessment Resources  SIMONE Reza & Daren, S. M. (2002). Stroop Color and Word Test: A Manual for Clinical and Experimental Uses. [place], [place]: [email or web address].  Std. Score (M=100 + SD=15); T (50 + 10); Sc. Score (10 + 3)  %ile = % of normal group scoring lower than patient; 25-75 is average.  WNL = Within Normal Limits.  †  For scores with this symbol, higher is worse; %shama are corrected so that higher %ile = better performance.  ** ESL, answered in native language

## 2024-06-20 ENCOUNTER — APPOINTMENT (OUTPATIENT)
Dept: SLEEP MEDICINE | Facility: CLINIC | Age: 59
End: 2024-06-20
Payer: COMMERCIAL

## 2024-06-26 ENCOUNTER — CLINICAL SUPPORT (OUTPATIENT)
Dept: SLEEP MEDICINE | Facility: CLINIC | Age: 59
End: 2024-06-26
Payer: COMMERCIAL

## 2024-06-26 DIAGNOSIS — G47.50 PARASOMNIA, UNSPECIFIED TYPE: ICD-10-CM

## 2024-06-26 DIAGNOSIS — G47.30 SLEEP DISORDER BREATHING: ICD-10-CM

## 2024-06-26 ASSESSMENT — SLEEP AND FATIGUE QUESTIONNAIRES
HOW LIKELY ARE YOU TO NOD OFF OR FALL ASLEEP WHILE SITTING AND READING: WOULD NEVER DOZE
HOW LIKELY ARE YOU TO NOD OFF OR FALL ASLEEP WHILE SITTING AND TALKING TO SOMEONE: WOULD NEVER DOZE
HOW LIKELY ARE YOU TO NOD OFF OR FALL ASLEEP WHEN YOU ARE A PASSENGER IN A CAR FOR AN HOUR WITHOUT A BREAK: SLIGHT CHANCE OF DOZING
HOW LIKELY ARE YOU TO NOD OFF OR FALL ASLEEP WHILE LYING DOWN TO REST IN THE AFTERNOON WHEN CIRCUMSTANCES PERMIT: HIGH CHANCE OF DOZING
ESS-CHAD TOTAL SCORE: 4
HOW LIKELY ARE YOU TO NOD OFF OR FALL ASLEEP WHILE WATCHING TV: WOULD NEVER DOZE
HOW LIKELY ARE YOU TO NOD OFF OR FALL ASLEEP WHILE SITTING QUIETLY AFTER LUNCH WITHOUT ALCOHOL: WOULD NEVER DOZE
HOW LIKELY ARE YOU TO NOD OFF OR FALL ASLEEP IN A CAR, WHILE STOPPED FOR A FEW MINUTES IN TRAFFIC: WOULD NEVER DOZE
SITING INACTIVE IN A PUBLIC PLACE LIKE A CLASS ROOM OR A MOVIE THEATER: WOULD NEVER DOZE

## 2024-06-27 NOTE — PROGRESS NOTES
[facility] TECH NOTE:     Patient: Mihai Ruelas   MRN//AGE: [id number]  1965  58 y.o.   Technologist: Adriana Appiah   Room: 2   Service Date: 2024        Sleep Testing Location: [facility]:     TECHNOLOGIST SLEEP STUDY PROCEDURE NOTE:   This sleep study is being conducted according to the policies and procedures outlined by the [employer] accreditation standards.  The sleep study procedure and processes involved during this appointment was explained to the patient/patient’s family, questions were answered. The patient/family verbalized understanding.      The patient is a 58 y.o. year old male scheduled for aDiagnostic PSG Split night with montage of: Parasomnia. he arrived for his appointment.      The study that was ultimately completed was aDiagnostic PSG Split night with montage of: Parasomnia.    The full study Was completed.  Patient questionnaires completed?: yes     Consents signed? yes    Initial Fall Risk Screening:     Mihai has not fallen in the last 6 months. his did not result in injury. Mihai does not have a fear of falling. He does not need assistance with sitting, standing, or walking. he does not need assistance walking in his home. he does not need assistance in an unfamiliar setting. The patient is notusing an assistive device.     Brief Study observations: The patient is a 58 year old male here for a SPLIT study with RBD protocol. Maninder arrived at 1945 and completed paperwork. This is his first PSG. Usual bedtime 2130. Wakes 0630 during week. Sleeps in side position at home.  Commercial insurance-3%  The sleep study procedure and process involved during this appointment was explained to the patient and questions were answered. The patient verbalized understanding.     Deviation to order/protocol and reason: none      If PAP, which was preferred mask/pressure/mode:       Other:None    After the procedure, the patient/family was informed to ensure followup with ordering clinician for testing  results.      Technologist: Adriana Appiah

## 2024-08-15 ENCOUNTER — APPOINTMENT (OUTPATIENT)
Dept: RADIOLOGY | Facility: HOSPITAL | Age: 59
End: 2024-08-15
Payer: COMMERCIAL

## 2024-08-15 ENCOUNTER — HOSPITAL ENCOUNTER (EMERGENCY)
Facility: HOSPITAL | Age: 59
Discharge: HOME | End: 2024-08-15
Payer: COMMERCIAL

## 2024-08-15 VITALS
HEART RATE: 63 BPM | RESPIRATION RATE: 16 BRPM | BODY MASS INDEX: 26.96 KG/M2 | DIASTOLIC BLOOD PRESSURE: 76 MMHG | TEMPERATURE: 98.1 F | WEIGHT: 182 LBS | SYSTOLIC BLOOD PRESSURE: 157 MMHG | HEIGHT: 69 IN | OXYGEN SATURATION: 97 %

## 2024-08-15 DIAGNOSIS — S62.665A CLOSED NONDISPLACED FRACTURE OF DISTAL PHALANX OF LEFT RING FINGER, INITIAL ENCOUNTER: ICD-10-CM

## 2024-08-15 DIAGNOSIS — S61.215A LACERATION OF LEFT RING FINGER WITHOUT FOREIGN BODY WITHOUT DAMAGE TO NAIL, INITIAL ENCOUNTER: ICD-10-CM

## 2024-08-15 DIAGNOSIS — S61.213A LACERATION OF LEFT MIDDLE FINGER WITHOUT FOREIGN BODY WITHOUT DAMAGE TO NAIL, INITIAL ENCOUNTER: Primary | ICD-10-CM

## 2024-08-15 PROCEDURE — 73130 X-RAY EXAM OF HAND: CPT | Mod: LT

## 2024-08-15 PROCEDURE — 2500000001 HC RX 250 WO HCPCS SELF ADMINISTERED DRUGS (ALT 637 FOR MEDICARE OP): Performed by: NURSE PRACTITIONER

## 2024-08-15 PROCEDURE — 99283 EMERGENCY DEPT VISIT LOW MDM: CPT | Mod: 25

## 2024-08-15 PROCEDURE — 2500000004 HC RX 250 GENERAL PHARMACY W/ HCPCS (ALT 636 FOR OP/ED): Mod: JZ | Performed by: NURSE PRACTITIONER

## 2024-08-15 PROCEDURE — 73130 X-RAY EXAM OF HAND: CPT | Mod: LEFT SIDE | Performed by: RADIOLOGY

## 2024-08-15 RX ORDER — ARIPIPRAZOLE 5 MG/1
1 TABLET ORAL DAILY
COMMUNITY

## 2024-08-15 RX ORDER — AMLODIPINE BESYLATE 10 MG/1
10 TABLET ORAL DAILY
COMMUNITY

## 2024-08-15 RX ORDER — BUPIVACAINE HYDROCHLORIDE 5 MG/ML
10 INJECTION, SOLUTION PERINEURAL ONCE
Status: DISCONTINUED | OUTPATIENT
Start: 2024-08-15 | End: 2024-08-15

## 2024-08-15 RX ORDER — FLUOXETINE HYDROCHLORIDE 40 MG/1
80 CAPSULE ORAL DAILY
COMMUNITY

## 2024-08-15 RX ORDER — BUPROPION HYDROCHLORIDE 150 MG/1
150 TABLET, EXTENDED RELEASE ORAL 3 TIMES DAILY
COMMUNITY

## 2024-08-15 RX ORDER — BUPIVACAINE HYDROCHLORIDE 5 MG/ML
10 INJECTION, SOLUTION EPIDURAL; INTRACAUDAL ONCE
Status: COMPLETED | OUTPATIENT
Start: 2024-08-15 | End: 2024-08-15

## 2024-08-15 RX ORDER — CEPHALEXIN 500 MG/1
500 CAPSULE ORAL ONCE
Status: COMPLETED | OUTPATIENT
Start: 2024-08-15 | End: 2024-08-15

## 2024-08-15 RX ORDER — IBUPROFEN 400 MG/1
400 TABLET ORAL ONCE
Status: COMPLETED | OUTPATIENT
Start: 2024-08-15 | End: 2024-08-15

## 2024-08-15 RX ORDER — HYDROCODONE BITARTRATE AND ACETAMINOPHEN 5; 325 MG/1; MG/1
1 TABLET ORAL EVERY 6 HOURS PRN
Qty: 12 TABLET | Refills: 0 | Status: SHIPPED | OUTPATIENT
Start: 2024-08-15 | End: 2024-08-18

## 2024-08-15 RX ORDER — BACITRACIN ZINC 500 UNIT/G
OINTMENT IN PACKET (EA) TOPICAL ONCE
Status: COMPLETED | OUTPATIENT
Start: 2024-08-15 | End: 2024-08-15

## 2024-08-15 RX ORDER — IBUPROFEN 600 MG/1
600 TABLET ORAL EVERY 8 HOURS PRN
Qty: 21 TABLET | Refills: 0 | Status: SHIPPED | OUTPATIENT
Start: 2024-08-15 | End: 2024-08-22

## 2024-08-15 RX ORDER — CEPHALEXIN 500 MG/1
500 CAPSULE ORAL 4 TIMES DAILY
Qty: 28 CAPSULE | Refills: 0 | Status: SHIPPED | OUTPATIENT
Start: 2024-08-15 | End: 2024-08-22

## 2024-08-15 RX ADMIN — IBUPROFEN 400 MG: 400 TABLET, FILM COATED ORAL at 10:53

## 2024-08-15 RX ADMIN — BACITRACIN ZINC 1 APPLICATION: 500 OINTMENT TOPICAL at 12:02

## 2024-08-15 RX ADMIN — BUPIVACAINE HYDROCHLORIDE 50 MG: 5 INJECTION, SOLUTION EPIDURAL; INTRACAUDAL; PERINEURAL at 11:10

## 2024-08-15 RX ADMIN — CEPHALEXIN 500 MG: 500 CAPSULE ORAL at 12:01

## 2024-08-15 ASSESSMENT — PAIN DESCRIPTION - LOCATION: LOCATION: FINGER (COMMENT WHICH ONE)

## 2024-08-15 ASSESSMENT — PAIN SCALES - GENERAL
PAINLEVEL_OUTOF10: 0 - NO PAIN
PAINLEVEL_OUTOF10: 10 - WORST POSSIBLE PAIN

## 2024-08-15 ASSESSMENT — LIFESTYLE VARIABLES
HAVE YOU EVER FELT YOU SHOULD CUT DOWN ON YOUR DRINKING: NO
EVER FELT BAD OR GUILTY ABOUT YOUR DRINKING: NO
TOTAL SCORE: 0
HAVE PEOPLE ANNOYED YOU BY CRITICIZING YOUR DRINKING: NO
EVER HAD A DRINK FIRST THING IN THE MORNING TO STEADY YOUR NERVES TO GET RID OF A HANGOVER: NO

## 2024-08-15 ASSESSMENT — PAIN DESCRIPTION - PAIN TYPE: TYPE: ACUTE PAIN

## 2024-08-15 ASSESSMENT — PAIN - FUNCTIONAL ASSESSMENT
PAIN_FUNCTIONAL_ASSESSMENT: 0-10
PAIN_FUNCTIONAL_ASSESSMENT: 0-10

## 2024-08-15 ASSESSMENT — PAIN DESCRIPTION - ORIENTATION: ORIENTATION: LEFT

## 2024-08-15 ASSESSMENT — COLUMBIA-SUICIDE SEVERITY RATING SCALE - C-SSRS
6. HAVE YOU EVER DONE ANYTHING, STARTED TO DO ANYTHING, OR PREPARED TO DO ANYTHING TO END YOUR LIFE?: NO
2. HAVE YOU ACTUALLY HAD ANY THOUGHTS OF KILLING YOURSELF?: NO
1. IN THE PAST MONTH, HAVE YOU WISHED YOU WERE DEAD OR WISHED YOU COULD GO TO SLEEP AND NOT WAKE UP?: NO

## 2024-08-15 NOTE — ED PROVIDER NOTES
Limitations to History: None     HPI:      Mihai Ruelas is a 58 y.o. with significant past medical history for HTN/HLD and prostate CA and up-to-date tetanus status presenting to ED today from home by himself for evaluation of laceration to the third and fourth fingers of left hand.  Just prior to arrival, the patient was making a bird house, he was making some cuts with a circular saw and sustained lacerations to the lateral aspect of the distal left third and fourth fingers.  Constant throbbing pain is rated 10/10.  Hemostasis maintained with the application of pressure.  Not wearing gloves.  Denies fever/chills, cough/cold symptoms, chest pain, shortness of breath, nausea/vomiting, abdominal pain, urinary symptoms, change in bowel habits or any other complaints.  No EtOH, smoking or drug use.    Additional History Obtained from: None    ------------------------------------------------------------------------------------------------------------------------------------------    VS: As documented in the triage note and EMR flowsheet from this visit were reviewed.    Physical Exam:  Gen: Well-appearing 58-year-old  male, awake and alert, oriented x 3.  Well-nourished and hydrated.  Appears uncomfortable but nontoxic.  Musculoskeletal: Full range of motion all IP joints third and fifth fingers left hand, MSPs intact.  No deformities.  Neurologic: Alert, symmetrical facies, phonates clearly, moves all extremities equally, responsive to touch, ambulates normally   Skin: Laceration #1: 2 cm jagged laceration extending from lateral aspect 4th finger left hand from the border of the nail around the pad of the finger on the palmar side.  Hemostasis maintained.  Laceration #2: 1 cm jagged laceration extending around the lateral aspect of the 3rd finger from the nail edge toward the pad of the finger.  Hemostasis maintained.  No rashes  noted        ------------------------------------------------------------------------------------------------------------------------------------------    Medical Decision Making: Well-appearing 58-year-old  male with known HTN/HLD and recent diagnosis of prostate CA is evaluated at the bedside after sustaining lacerations to the third and fourth fingers with a circular saw just prior to arrival.  Vital signs within normal limits.  Afebrile.  Edges of these lacerations are very jagged and the skin is also abraded, hemostasis maintained.  Neurovascular intact.  X-ray to rule out open fracture.  Tetanus is up-to-date.  Motrin for pain.  Will perform digital block.  See procedure note for wound closure.    ED Course as of 08/15/24 1303   Thu Aug 15, 2024   1142 X-ray left hand shows nondisplaced fracture/defect of the fourth distal phalanx with overlying soft tissue defect and adjacent punctate radiopaque foreign body/osseous fragments. [SB]   1223 Both lacerations were closed with Steri-Strips.  See procedure note.  Hemostasis maintained.  Nonstick dressing and AlumaFoam splint to the fourth left finger for fracture.  Bacitracin and dressing to the laceration on the third finger left hand.  Pain well-controlled with digital block.  Remains neurovascularly intact.  Vital signs within normal limits.  I have a call out to orthopedic hand however they have not returned our call in the last 30 minutes.  Patient is requesting to be discharged.  Will follow-up in the office with Dr. Arcos in the next 2 to 3 days.  Cephalexin 4 times a day x 7 days.  Motrin for moderate pain.  3-day supply [place] for severe pain.  Prescription sent to pharmacy.  OARRS report is clear.  Wound care discussed.  Return precautions discussed.  Diagnosis, treatment and plan discussed with patient, he verbalizes understanding and is in agreement.  Condition stable for discharge.       [SB]   2797 I spoke with Dr. Painting of [facility]  orthopedics regarding the case.  Patient is to call the office this afternoon to schedule an appointment tomorrow morning with Dr. Arcos in the office.  I communicated this to the patient via his cell phone.  He verbalizes understanding. [SB]      ED Course User Index  [SB] Danisha Chen, JASPREET-CNP         Diagnoses as of 08/15/24 1303   Laceration of left middle finger without foreign body without damage to nail, initial encounter   Laceration of left ring finger without foreign body without damage to nail, initial encounter   Closed nondisplaced fracture of distal phalanx of left ring finger, initial encounter       EKG interpreted by myself (ED attending physician): None    Chronic Medical Conditions Significantly Affecting Care: None    External Records Reviewed: I reviewed recent and relevant outside records including: None     Discussion of Management with Other Providers: None     I discussed the patient/results with: Ortho Dr. Garima Chen, APRN-CNP  08/15/24 1854

## 2024-08-15 NOTE — DISCHARGE INSTRUCTIONS
You have a lacerations on the third and fourth fingers of the left hand from a circular saw.  Third finger was Steri-Stripped and dressing applied.  Fourth finger has a fracture at the tip of the finger that is nondisplaced, concern for open fracture.  You are placed on cephalexin 4 times a day x 7 days, first dose given in the emergency department.  Remainder sent to your pharmacy.  Laceration on fourth finger was also closed with Steri-Strips.  Dressing and AlumaFoam splint applied.  Keep today's dressing on x 24 hours unless it bleeds through.  Follow-up with orthopedic hand in the office.  Wounds will be washed with mild soap, Steri-Strips will fall off on their own.  Motrin for moderate pain.  3-day supply [place] for severe pain.  Elevation.  Watch for signs of infection including redness and purulent drainage.

## 2024-08-15 NOTE — ED TRIAGE NOTES
PT. STATES CUT LEFT HAND ON CIRCULAR SAW PTA. LAC NOTED TO LEFT 4TH AND 3RD DIGITS. PT. STATES HAVING SOME NUMBNESS TO TIP OF LEFT 4TH DIGIT. LAST TETANUS LESS THAN 5 YEARS AGO PER PT.